# Patient Record
Sex: FEMALE | Race: BLACK OR AFRICAN AMERICAN | NOT HISPANIC OR LATINO | URBAN - METROPOLITAN AREA
[De-identification: names, ages, dates, MRNs, and addresses within clinical notes are randomized per-mention and may not be internally consistent; named-entity substitution may affect disease eponyms.]

---

## 2018-01-22 ENCOUNTER — EMERGENCY (EMERGENCY)
Facility: HOSPITAL | Age: 18
LOS: 1 days | Discharge: HOME | End: 2018-01-22
Attending: EMERGENCY MEDICINE | Admitting: EMERGENCY MEDICINE
Payer: MEDICAID

## 2018-01-22 VITALS
HEART RATE: 113 BPM | OXYGEN SATURATION: 99 % | WEIGHT: 169.98 LBS | DIASTOLIC BLOOD PRESSURE: 75 MMHG | RESPIRATION RATE: 22 BRPM | TEMPERATURE: 98 F | SYSTOLIC BLOOD PRESSURE: 146 MMHG

## 2018-01-22 PROCEDURE — 99291 CRITICAL CARE FIRST HOUR: CPT | Mod: 25

## 2018-01-22 PROCEDURE — 93010 ELECTROCARDIOGRAM REPORT: CPT

## 2018-01-22 RX ORDER — IPRATROPIUM/ALBUTEROL SULFATE 18-103MCG
3 AEROSOL WITH ADAPTER (GRAM) INHALATION
Qty: 0 | Refills: 0 | Status: COMPLETED | OUTPATIENT
Start: 2018-01-22 | End: 2018-01-22

## 2018-01-22 RX ADMIN — Medication 3 MILLILITER(S): at 23:45

## 2018-01-22 RX ADMIN — Medication 125 MILLIGRAM(S): at 23:45

## 2018-01-22 RX ADMIN — Medication 3 MILLILITER(S): at 23:42

## 2018-01-22 RX ADMIN — Medication 3 MILLILITER(S): at 23:44

## 2018-01-22 NOTE — ED ADULT TRIAGE NOTE - CHIEF COMPLAINT QUOTE
Pt BIBA for c/o asthma exacerbation, given 0.3 mg IM epi, duonebs, 12 mg dex and 2 grams of mag in the field. Pt with bilat wheeze, hyperventilating. MD Mayers at bedside.

## 2018-01-23 VITALS
SYSTOLIC BLOOD PRESSURE: 129 MMHG | RESPIRATION RATE: 24 BRPM | DIASTOLIC BLOOD PRESSURE: 60 MMHG | OXYGEN SATURATION: 96 % | HEART RATE: 108 BPM | TEMPERATURE: 98 F

## 2018-01-23 LAB
ALBUMIN SERPL ELPH-MCNC: 3.8 G/DL — SIGNIFICANT CHANGE UP (ref 3.4–5)
ALBUMIN SERPL ELPH-MCNC: 3.8 G/DL — SIGNIFICANT CHANGE UP (ref 3.4–5)
ALP SERPL-CCNC: 51 U/L — SIGNIFICANT CHANGE UP (ref 40–120)
ALP SERPL-CCNC: 55 U/L — SIGNIFICANT CHANGE UP (ref 40–120)
ALT FLD-CCNC: 13 U/L — SIGNIFICANT CHANGE UP (ref 12–42)
ALT FLD-CCNC: 9 U/L — LOW (ref 12–42)
ANION GAP SERPL CALC-SCNC: 15 MMOL/L — SIGNIFICANT CHANGE UP (ref 9–16)
ANION GAP SERPL CALC-SCNC: 16 MMOL/L — SIGNIFICANT CHANGE UP (ref 9–16)
AST SERPL-CCNC: 15 U/L — SIGNIFICANT CHANGE UP (ref 15–37)
AST SERPL-CCNC: 16 U/L — SIGNIFICANT CHANGE UP (ref 15–37)
BASOPHILS NFR BLD AUTO: 0.5 % — SIGNIFICANT CHANGE UP (ref 0–2)
BILIRUB SERPL-MCNC: 0.2 MG/DL — SIGNIFICANT CHANGE UP (ref 0.2–1.2)
BILIRUB SERPL-MCNC: 0.5 MG/DL — SIGNIFICANT CHANGE UP (ref 0.2–1.2)
BUN SERPL-MCNC: 10 MG/DL — SIGNIFICANT CHANGE UP (ref 7–23)
BUN SERPL-MCNC: 12 MG/DL — SIGNIFICANT CHANGE UP (ref 7–23)
CALCIUM SERPL-MCNC: 8.4 MG/DL — LOW (ref 8.5–10.5)
CALCIUM SERPL-MCNC: 8.5 MG/DL — SIGNIFICANT CHANGE UP (ref 8.5–10.5)
CHLORIDE SERPL-SCNC: 104 MMOL/L — SIGNIFICANT CHANGE UP (ref 96–108)
CHLORIDE SERPL-SCNC: 106 MMOL/L — SIGNIFICANT CHANGE UP (ref 96–108)
CO2 SERPL-SCNC: 18 MMOL/L — LOW (ref 22–31)
CO2 SERPL-SCNC: 19 MMOL/L — LOW (ref 22–31)
CREAT SERPL-MCNC: 0.87 MG/DL — SIGNIFICANT CHANGE UP (ref 0.5–1.3)
CREAT SERPL-MCNC: 1.14 MG/DL — SIGNIFICANT CHANGE UP (ref 0.5–1.3)
EOSINOPHIL NFR BLD AUTO: 1 % — SIGNIFICANT CHANGE UP (ref 0–6)
FLUAV SPEC QL CULT: NEGATIVE — SIGNIFICANT CHANGE UP
FLUBV AG SPEC QL IA: NEGATIVE — SIGNIFICANT CHANGE UP
GLUCOSE SERPL-MCNC: 159 MG/DL — HIGH (ref 70–99)
GLUCOSE SERPL-MCNC: 214 MG/DL — HIGH (ref 70–99)
HCG SERPL-ACNC: <1 MIU/ML — SIGNIFICANT CHANGE UP
HCT VFR BLD CALC: 38 % — SIGNIFICANT CHANGE UP (ref 34.5–45)
HCT VFR BLD CALC: 38.8 % — SIGNIFICANT CHANGE UP (ref 34.5–45)
HGB BLD-MCNC: 13.6 G/DL — SIGNIFICANT CHANGE UP (ref 11.5–15.5)
HGB BLD-MCNC: 13.9 G/DL — SIGNIFICANT CHANGE UP (ref 11.5–15.5)
IMM GRANULOCYTES NFR BLD AUTO: 0.6 % — SIGNIFICANT CHANGE UP (ref 0–1.5)
LYMPHOCYTES # BLD AUTO: 37.8 % — SIGNIFICANT CHANGE UP (ref 13–44)
MAGNESIUM SERPL-MCNC: 2.2 MG/DL — SIGNIFICANT CHANGE UP (ref 1.6–2.6)
MCHC RBC-ENTMCNC: 31.4 PG — SIGNIFICANT CHANGE UP (ref 27–34)
MCHC RBC-ENTMCNC: 31.4 PG — SIGNIFICANT CHANGE UP (ref 27–34)
MCHC RBC-ENTMCNC: 35.8 G/DL — SIGNIFICANT CHANGE UP (ref 32–36)
MCHC RBC-ENTMCNC: 35.8 G/DL — SIGNIFICANT CHANGE UP (ref 32–36)
MCV RBC AUTO: 87.8 FL — SIGNIFICANT CHANGE UP (ref 80–100)
MCV RBC AUTO: 87.8 FL — SIGNIFICANT CHANGE UP (ref 80–100)
MONOCYTES NFR BLD AUTO: 3.7 % — SIGNIFICANT CHANGE UP (ref 2–14)
NEUTROPHILS NFR BLD AUTO: 56.4 % — SIGNIFICANT CHANGE UP (ref 43–77)
PCO2 BLDV: 27 MMHG — LOW (ref 41–51)
PCO2 BLDV: 29 MMHG — LOW (ref 41–51)
PH BLDV: 7.41 — SIGNIFICANT CHANGE UP (ref 7.32–7.43)
PH BLDV: 7.44 — HIGH (ref 7.32–7.43)
PLATELET # BLD AUTO: 215 K/UL — SIGNIFICANT CHANGE UP (ref 150–400)
PLATELET # BLD AUTO: 228 K/UL — SIGNIFICANT CHANGE UP (ref 150–400)
PO2 BLDV: 68 MMHG — HIGH (ref 35–40)
PO2 BLDV: 81 MMHG — HIGH (ref 35–40)
POTASSIUM SERPL-MCNC: 2.5 MMOL/L — CRITICAL LOW (ref 3.5–5.3)
POTASSIUM SERPL-MCNC: 2.9 MMOL/L — CRITICAL LOW (ref 3.5–5.3)
POTASSIUM SERPL-SCNC: 2.5 MMOL/L — CRITICAL LOW (ref 3.5–5.3)
POTASSIUM SERPL-SCNC: 2.9 MMOL/L — CRITICAL LOW (ref 3.5–5.3)
PROT SERPL-MCNC: 6.8 G/DL — SIGNIFICANT CHANGE UP (ref 6.4–8.2)
PROT SERPL-MCNC: 7.1 G/DL — SIGNIFICANT CHANGE UP (ref 6.4–8.2)
RAPID RVP RESULT: SIGNIFICANT CHANGE UP
RBC # BLD: 4.33 M/UL — SIGNIFICANT CHANGE UP (ref 3.8–5.2)
RBC # BLD: 4.42 M/UL — SIGNIFICANT CHANGE UP (ref 3.8–5.2)
RBC # FLD: 12.1 % — SIGNIFICANT CHANGE UP (ref 10.3–16.9)
RBC # FLD: 12.1 % — SIGNIFICANT CHANGE UP (ref 10.3–16.9)
SAO2 % BLDV: 94 % — SIGNIFICANT CHANGE UP
SAO2 % BLDV: 97 % — SIGNIFICANT CHANGE UP
SODIUM SERPL-SCNC: 138 MMOL/L — SIGNIFICANT CHANGE UP (ref 132–145)
SODIUM SERPL-SCNC: 140 MMOL/L — SIGNIFICANT CHANGE UP (ref 132–145)
WBC # BLD: 10 K/UL — SIGNIFICANT CHANGE UP (ref 3.8–10.5)
WBC # BLD: 10.4 K/UL — SIGNIFICANT CHANGE UP (ref 3.8–10.5)
WBC # FLD AUTO: 10 K/UL — SIGNIFICANT CHANGE UP (ref 3.8–10.5)
WBC # FLD AUTO: 10.4 K/UL — SIGNIFICANT CHANGE UP (ref 3.8–10.5)

## 2018-01-23 PROCEDURE — 71275 CT ANGIOGRAPHY CHEST: CPT | Mod: 26

## 2018-01-23 RX ORDER — ALBUTEROL 90 UG/1
2.5 AEROSOL, METERED ORAL ONCE
Qty: 0 | Refills: 0 | Status: DISCONTINUED | OUTPATIENT
Start: 2018-01-23 | End: 2018-01-26

## 2018-01-23 RX ORDER — POTASSIUM CHLORIDE 20 MEQ
40 PACKET (EA) ORAL ONCE
Qty: 0 | Refills: 0 | Status: COMPLETED | OUTPATIENT
Start: 2018-01-23 | End: 2018-01-23

## 2018-01-23 RX ORDER — ALBUTEROL 90 UG/1
2.5 AEROSOL, METERED ORAL ONCE
Qty: 0 | Refills: 0 | Status: COMPLETED | OUTPATIENT
Start: 2018-01-23 | End: 2018-01-23

## 2018-01-23 RX ADMIN — ALBUTEROL 2.5 MILLIGRAM(S): 90 AEROSOL, METERED ORAL at 01:05

## 2018-01-23 RX ADMIN — Medication 40 MILLIEQUIVALENT(S): at 01:47

## 2018-01-23 NOTE — ED PROVIDER NOTE - PROGRESS NOTE DETAILS
pt reports some improvement but peak flow 150 despite treatment, will CTA r/o PE, if neg, will need admission for asthma exacerbation d/w [facility], no stepdown beds, will call [facility] system for transfer for stepdown bed, pt amenable to [place] transfer. d/w [facility], no stepdown beds, will call other system for transfer    vbg added, repeat bmp ordered, pt continues to report feeling sob, neg ct, multiple rounds of bronchodilators given, will place on bipap (no retractions noted, but noted pt taking deep breaths w/ increased effort) upon initial evaluation at triage, pt has good air flow, end exp wheezing noted, no retractions, no respiratory distress, felt bipap not indicated at the moment reassessed after additional meds given in ED, pt reports some improvement but peak flow 150 despite treatment, +wheezing, pt states feels worse than her typical asthma, at this point, will CTA r/o PE, if neg, will need admission for asthma exacerbation (feels pt does not require MICU, but stepdown unit may be appropriate for management) d/w [facility], no stepdown beds, will call other system for transfer  vbg added, repeat bmp ordered, pt continues to report feeling sob, neg ct, multiple rounds of bronchodilators given, will place on bipap (no retractions noted, but noted pt taking deep breaths w/ increased effort)  d/w [facility] MICU fellow, accepting pt to MICU, under Dr. Resendez ([facility]) noted hypokalemia, likely 2/2 multiple albuterol use, will replete and recheck

## 2018-01-23 NOTE — ED PROVIDER NOTE - OBJECTIVE STATEMENT
17 yof pw asthma exacerbation, wheezing.  hx of asthma, no prior intubation.  occurred suddenly while on train, no OCP or hormone use, no leg pain/swelling, no hx of CAD or PE.  rec'd 3 duonebs, 2g of MgSulfate and 0.3IM epi via EMS. 17 yof pw asthma exacerbation, wheezing.  hx of asthma, no prior intubation.  occurred suddenly while on train, no OCP or hormone use, no leg pain/swelling, no hx of CAD or PE.  rec'd 3 duonebs, 2g of MgSulfate and 0.3IM epi via EMS.  upon arrival, at triage, pt had end exp wheezing but moving good air, no retractions noted.

## 2018-01-23 NOTE — ED PROVIDER NOTE - PHYSICAL EXAMINATION
CON: ao x 3, HENMT: clear oropharynx, soft neck, HEAD: atraumatic, CV: tachycardia, RESP: end exp wheezing, good air entry, GI: +BS, soft, nontender, no rebound, no guarding, SKIN: no rash, MSK: no deformities, no calf swelling or tenderness, NEURO: no gross motor or sensory deficit

## 2018-01-23 NOTE — ED ADULT NURSE REASSESSMENT NOTE - NS ED NURSE REASSESS COMMENT FT1
peak flow 150
peak flow after 3 duonebs and 3 albuterols tx is 150. MD is aware.
placed on bipap.  Educated to the nature and process of assisted mechanical ventilation.  Well tolerated.  nebulizer applied and labs drawn and sent.  no further distress noted

## 2018-01-23 NOTE — ED PROVIDER NOTE - MEDICAL DECISION MAKING DETAILS
additional nebs given, solumedrol given, reassess additional nebs given, solumedrol given, reassess, see progress note

## 2018-01-23 NOTE — ED PROVIDER NOTE - CARE PLAN
Principal Discharge DX:	Asthma exacerbation Principal Discharge DX:	Asthma exacerbation  Secondary Diagnosis:	Hypokalemia

## 2018-01-26 DIAGNOSIS — J45.901 UNSPECIFIED ASTHMA WITH (ACUTE) EXACERBATION: ICD-10-CM

## 2018-01-26 DIAGNOSIS — E87.6 HYPOKALEMIA: ICD-10-CM

## 2022-09-07 ENCOUNTER — INPATIENT (INPATIENT)
Facility: HOSPITAL | Age: 22
LOS: 0 days | Discharge: HOME | End: 2022-09-07
Attending: OBSTETRICS & GYNECOLOGY | Admitting: OBSTETRICS & GYNECOLOGY

## 2022-09-07 VITALS
RESPIRATION RATE: 18 BRPM | TEMPERATURE: 99 F | HEART RATE: 77 BPM | OXYGEN SATURATION: 98 % | DIASTOLIC BLOOD PRESSURE: 69 MMHG | SYSTOLIC BLOOD PRESSURE: 114 MMHG

## 2022-09-07 VITALS
TEMPERATURE: 98 F | HEART RATE: 72 BPM | OXYGEN SATURATION: 99 % | DIASTOLIC BLOOD PRESSURE: 54 MMHG | RESPIRATION RATE: 16 BRPM | SYSTOLIC BLOOD PRESSURE: 107 MMHG

## 2022-09-07 PROBLEM — J45.909 UNSPECIFIED ASTHMA, UNCOMPLICATED: Chronic | Status: ACTIVE | Noted: 2018-01-23

## 2022-09-07 LAB
BLD GP AB SCN SERPL QL: NEGATIVE — SIGNIFICANT CHANGE UP
COVID-19 SPIKE DOMAIN AB INTERP: POSITIVE
COVID-19 SPIKE DOMAIN ANTIBODY RESULT: >250 U/ML — HIGH
HCT VFR BLD CALC: 30.6 % — LOW (ref 34.5–45)
HGB BLD-MCNC: 10.9 G/DL — LOW (ref 11.5–15.5)
MCHC RBC-ENTMCNC: 33 PG — SIGNIFICANT CHANGE UP (ref 27–34)
MCHC RBC-ENTMCNC: 35.6 GM/DL — SIGNIFICANT CHANGE UP (ref 32–36)
MCV RBC AUTO: 92.7 FL — SIGNIFICANT CHANGE UP (ref 80–100)
NRBC # BLD: 0 /100 WBCS — SIGNIFICANT CHANGE UP (ref 0–0)
NRBC # FLD: 0 K/UL — SIGNIFICANT CHANGE UP (ref 0–0)
PLATELET # BLD AUTO: 122 K/UL — LOW (ref 150–400)
RBC # BLD: 3.3 M/UL — LOW (ref 3.8–5.2)
RBC # FLD: 12.2 % — SIGNIFICANT CHANGE UP (ref 10.3–14.5)
RH IG SCN BLD-IMP: POSITIVE — SIGNIFICANT CHANGE UP
SARS-COV-2 IGG+IGM SERPL QL IA: >250 U/ML — HIGH
SARS-COV-2 IGG+IGM SERPL QL IA: POSITIVE
WBC # BLD: 12.08 K/UL — HIGH (ref 3.8–10.5)
WBC # FLD AUTO: 12.08 K/UL — HIGH (ref 3.8–10.5)

## 2022-09-07 RX ORDER — OXYCODONE HYDROCHLORIDE 5 MG/1
5 TABLET ORAL
Refills: 0 | Status: DISCONTINUED | OUTPATIENT
Start: 2022-09-07 | End: 2022-09-07

## 2022-09-07 RX ORDER — KETOROLAC TROMETHAMINE 30 MG/ML
30 SYRINGE (ML) INJECTION ONCE
Refills: 0 | Status: DISCONTINUED | OUTPATIENT
Start: 2022-09-07 | End: 2022-09-07

## 2022-09-07 RX ORDER — OXYCODONE HYDROCHLORIDE 5 MG/1
5 TABLET ORAL ONCE
Refills: 0 | Status: DISCONTINUED | OUTPATIENT
Start: 2022-09-07 | End: 2022-09-07

## 2022-09-07 RX ORDER — TETANUS TOXOID, REDUCED DIPHTHERIA TOXOID AND ACELLULAR PERTUSSIS VACCINE, ADSORBED 5; 2.5; 8; 8; 2.5 [IU]/.5ML; [IU]/.5ML; UG/.5ML; UG/.5ML; UG/.5ML
0.5 SUSPENSION INTRAMUSCULAR ONCE
Refills: 0 | Status: DISCONTINUED | OUTPATIENT
Start: 2022-09-07 | End: 2022-09-07

## 2022-09-07 RX ORDER — SIMETHICONE 80 MG/1
80 TABLET, CHEWABLE ORAL EVERY 4 HOURS
Refills: 0 | Status: DISCONTINUED | OUTPATIENT
Start: 2022-09-07 | End: 2022-09-07

## 2022-09-07 RX ORDER — DIPHENHYDRAMINE HCL 50 MG
25 CAPSULE ORAL EVERY 6 HOURS
Refills: 0 | Status: DISCONTINUED | OUTPATIENT
Start: 2022-09-07 | End: 2022-09-07

## 2022-09-07 RX ORDER — BENZOCAINE 10 %
1 GEL (GRAM) MUCOUS MEMBRANE EVERY 6 HOURS
Refills: 0 | Status: DISCONTINUED | OUTPATIENT
Start: 2022-09-07 | End: 2022-09-07

## 2022-09-07 RX ORDER — HYDROCORTISONE 1 %
1 OINTMENT (GRAM) TOPICAL EVERY 6 HOURS
Refills: 0 | Status: DISCONTINUED | OUTPATIENT
Start: 2022-09-07 | End: 2022-09-07

## 2022-09-07 RX ORDER — ACETAMINOPHEN 500 MG
2 TABLET ORAL
Qty: 0 | Refills: 0 | DISCHARGE

## 2022-09-07 RX ORDER — INFLUENZA VIRUS VACCINE 15; 15; 15; 15 UG/.5ML; UG/.5ML; UG/.5ML; UG/.5ML
0.5 SUSPENSION INTRAMUSCULAR ONCE
Refills: 0 | Status: DISCONTINUED | OUTPATIENT
Start: 2022-09-07 | End: 2022-09-07

## 2022-09-07 RX ORDER — MAGNESIUM HYDROXIDE 400 MG/1
30 TABLET, CHEWABLE ORAL
Refills: 0 | Status: DISCONTINUED | OUTPATIENT
Start: 2022-09-07 | End: 2022-09-07

## 2022-09-07 RX ORDER — OXYTOCIN 10 UNIT/ML
333.33 VIAL (ML) INJECTION
Qty: 20 | Refills: 0 | Status: DISCONTINUED | OUTPATIENT
Start: 2022-09-07 | End: 2022-09-07

## 2022-09-07 RX ORDER — AER TRAVELER 0.5 G/1
1 SOLUTION RECTAL; TOPICAL EVERY 4 HOURS
Refills: 0 | Status: DISCONTINUED | OUTPATIENT
Start: 2022-09-07 | End: 2022-09-07

## 2022-09-07 RX ORDER — LANOLIN
1 OINTMENT (GRAM) TOPICAL EVERY 6 HOURS
Refills: 0 | Status: DISCONTINUED | OUTPATIENT
Start: 2022-09-07 | End: 2022-09-07

## 2022-09-07 RX ORDER — DIBUCAINE 1 %
1 OINTMENT (GRAM) RECTAL EVERY 6 HOURS
Refills: 0 | Status: DISCONTINUED | OUTPATIENT
Start: 2022-09-07 | End: 2022-09-07

## 2022-09-07 RX ORDER — IBUPROFEN 200 MG
600 TABLET ORAL EVERY 6 HOURS
Refills: 0 | Status: DISCONTINUED | OUTPATIENT
Start: 2022-09-07 | End: 2022-09-07

## 2022-09-07 RX ORDER — ACETAMINOPHEN 500 MG
975 TABLET ORAL
Refills: 0 | Status: DISCONTINUED | OUTPATIENT
Start: 2022-09-07 | End: 2022-09-07

## 2022-09-07 RX ORDER — IBUPROFEN 200 MG
600 TABLET ORAL EVERY 6 HOURS
Refills: 0 | Status: COMPLETED | OUTPATIENT
Start: 2022-09-07 | End: 2023-08-06

## 2022-09-07 RX ORDER — PRAMOXINE HYDROCHLORIDE 150 MG/15G
1 AEROSOL, FOAM RECTAL EVERY 4 HOURS
Refills: 0 | Status: DISCONTINUED | OUTPATIENT
Start: 2022-09-07 | End: 2022-09-07

## 2022-09-07 RX ORDER — IBUPROFEN 200 MG
1 TABLET ORAL
Qty: 0 | Refills: 0 | DISCHARGE

## 2022-09-07 RX ORDER — SODIUM CHLORIDE 9 MG/ML
3 INJECTION INTRAMUSCULAR; INTRAVENOUS; SUBCUTANEOUS EVERY 8 HOURS
Refills: 0 | Status: DISCONTINUED | OUTPATIENT
Start: 2022-09-07 | End: 2022-09-07

## 2022-09-07 RX ADMIN — Medication 975 MILLIGRAM(S): at 04:06

## 2022-09-07 RX ADMIN — Medication 1 APPLICATION(S): at 08:20

## 2022-09-07 RX ADMIN — Medication 600 MILLIGRAM(S): at 08:22

## 2022-09-07 RX ADMIN — Medication 600 MILLIGRAM(S): at 11:23

## 2022-09-07 NOTE — OB PROVIDER H&P - NSSCHADMISSION_OBGYN_A_OB
Denies known Latex allergy or symptoms of Latex sensitivity.  Medications reviewed and updated.  Pt. is here fasting for a follow up on her cholesterol.   No

## 2022-09-07 NOTE — DISCHARGE NOTE OB - CARE PLAN
Principal Discharge DX:	Normal vaginal delivery  Assessment and plan of treatment:	After discharge, please stay on pelvic rest for 6 weeks, meaning no sexual intercourse, no tampons and no douching.  No driving for 2 weeks as women can loose a lot of blood during delivery and there is a possibility of being lightheaded/fainting.  No lifting objects heavier than baby for two weeks.  Expect to have vaginal bleeding/spotting for up to six weeks.  The bleeding should get lighter and more white/light brown with time.  For bleeding soaking more than a pad an hour or passing clots greater than the size of your fist, come in to the emergency department.    Follow up in [facility] in 6 weeks.   1

## 2022-09-07 NOTE — CHART NOTE - NSCHARTNOTEFT_GEN_A_CORE
JUWAN JIM  22y  Female [id number]    HPI:    23yo  s/p  9/6 w/ First Degree tear . Patient was transferred from [place] for infant care at [facility]. Unfortunately infant passed at [facility] after patient arrived to [facility]. Currently patient feels "ok" but with sad affect. She denies any bothersome symptoms. Denies fevers, chills, CP, SOB. Has abdominal cramping. Bleeding has decreased since delivery.     Spoke to [place] resident over the phone as well. Per their records, patient had intermittent prenatal care but was seeing MFM and getting sonograms. 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 She had amniocentesis performed which showed 46XX, no genetic abnormalities detected.    – OB Hx:  G1: TOP  – PMH: denies  – Meds: PNV   – Allergies: Penicillin (hives), Latex (hives)  – PSHx: denies  – Social: [facility]      Hospital Meds:   MEDICATIONS  (STANDING):  acetaminophen     Tablet .. 975 milliGRAM(s) Oral <User Schedule>  diphtheria/tetanus/pertussis (acellular) Vaccine (ADAcel) 0.5 milliLiter(s) IntraMuscular once  ibuprofen  Tablet. 600 milliGRAM(s) Oral every 6 hours  influenza   Vaccine 0.5 milliLiter(s) IntraMuscular once  ketorolac   Injectable 30 milliGRAM(s) IV Push once  oxytocin Infusion 333.333 milliUNIT(s)/Min (1000 mL/Hr) IV Continuous <Continuous>  prenatal multivitamin 1 Tablet(s) Oral daily  sodium chloride 0.9% lock flush 3 milliLiter(s) IV Push every 8 hours    MEDICATIONS  (PRN):  benzocaine 20%/menthol 0.5% Spray 1 Spray(s) Topical every 6 hours PRN for Perineal discomfort  dibucaine 1% Ointment 1 Application(s) Topical every 6 hours PRN Perineal discomfort  diphenhydrAMINE 25 milliGRAM(s) Oral every 6 hours PRN Pruritus  hydrocortisone 1% Cream 1 Application(s) Topical every 6 hours PRN Moderate Pain (4-6)  lanolin Ointment 1 Application(s) Topical every 6 hours PRN nipple soreness  magnesium hydroxide Suspension 30 milliLiter(s) Oral two times a day PRN Constipation  oxyCODONE    IR 5 milliGRAM(s) Oral every 3 hours PRN Moderate to Severe Pain (4-10)  oxyCODONE    IR 5 milliGRAM(s) Oral once PRN Moderate to Severe Pain (4-10)  pramoxine 1%/zinc 5% Cream 1 Application(s) Topical every 4 hours PRN Moderate Pain (4-6)  simethicone 80 milliGRAM(s) Chew every 4 hours PRN Gas  witch hazel Pads 1 Application(s) Topical every 4 hours PRN Perineal discomfort      Vital Signs Last 24 Hrs  T(C): 37.2 (07 Sep 2022 06:33), Max: 37.2 (07 Sep 2022 06:33)  T(F): 99 (07 Sep 2022 06:33), Max: 99 (07 Sep 2022 06:33)  HR: 72 (07 Sep 2022 06:33) (72 - 75)  BP: 107/54 (07 Sep 2022 06:33) (78/54 - 107/54)  BP(mean): --  RR: 16 (07 Sep 2022 06:33) (16 - 16)  SpO2: 100% (07 Sep 2022 06:31) (99% - 100%)    Parameters below as of 07 Sep 2022 06:31  Patient On (Oxygen Delivery Method): room air      Physical Exam:   General: sitting comfortably in bed, NAD   HEENT: neck supple, full ROM  CV: RR S1S2 no m/r/g  Lungs: CTA b/l, good air flow b/l   Back: No CVA tenderness  Abd: Soft, non-tender, non-distended. Fundus firm.   Ext: non-tender b/l, no edema       23yo  s/p  9/6 w/ First Degree tear . Patient was transferred from [place] for infant care at [facility], who unfortunately passed away.  - SW consult placed for AM  - Tylenol/Motrin/Oxy for PP pain control  - Send CBC  - Prenatal labs in chart, no need for additional labs  - Rh+; no need for Rhogam  - VS per protocol    plan of care dw Dr.Jones Todd Botello PGY4 JUWAN JIM  22y  Female [id number]    HPI:    23yo  s/p  9/6 w/ First Degree tear . Patient was transferred from [place] for infant care at [facility]. Unfortunately infant passed at [facility] after patient arrived to [facility]. Currently patient feels "ok" but with sad affect. She denies any bothersome symptoms. Denies fevers, chills, CP, SOB. Has abdominal cramping. Bleeding has decreased since delivery.     Spoke to [place] resident over the phone as well. Per their records, patient had intermittent prenatal care but was seeing MFM and getting sonograms. 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 She had amniocentesis performed which showed 46XX, no genetic abnormalities detected.    – OB Hx:  G1: TOP  – PMH: denies  – Meds: PNV   – Allergies: Penicillin (hives), Latex (hives)  – PSHx: denies  – Social: [facility]      Hospital Meds:   MEDICATIONS  (STANDING):  acetaminophen     Tablet .. 975 milliGRAM(s) Oral <User Schedule>  diphtheria/tetanus/pertussis (acellular) Vaccine (ADAcel) 0.5 milliLiter(s) IntraMuscular once  ibuprofen  Tablet. 600 milliGRAM(s) Oral every 6 hours  influenza   Vaccine 0.5 milliLiter(s) IntraMuscular once  ketorolac   Injectable 30 milliGRAM(s) IV Push once  oxytocin Infusion 333.333 milliUNIT(s)/Min (1000 mL/Hr) IV Continuous <Continuous>  prenatal multivitamin 1 Tablet(s) Oral daily  sodium chloride 0.9% lock flush 3 milliLiter(s) IV Push every 8 hours    MEDICATIONS  (PRN):  benzocaine 20%/menthol 0.5% Spray 1 Spray(s) Topical every 6 hours PRN for Perineal discomfort  dibucaine 1% Ointment 1 Application(s) Topical every 6 hours PRN Perineal discomfort  diphenhydrAMINE 25 milliGRAM(s) Oral every 6 hours PRN Pruritus  hydrocortisone 1% Cream 1 Application(s) Topical every 6 hours PRN Moderate Pain (4-6)  lanolin Ointment 1 Application(s) Topical every 6 hours PRN nipple soreness  magnesium hydroxide Suspension 30 milliLiter(s) Oral two times a day PRN Constipation  oxyCODONE    IR 5 milliGRAM(s) Oral every 3 hours PRN Moderate to Severe Pain (4-10)  oxyCODONE    IR 5 milliGRAM(s) Oral once PRN Moderate to Severe Pain (4-10)  pramoxine 1%/zinc 5% Cream 1 Application(s) Topical every 4 hours PRN Moderate Pain (4-6)  simethicone 80 milliGRAM(s) Chew every 4 hours PRN Gas  witch hazel Pads 1 Application(s) Topical every 4 hours PRN Perineal discomfort      Vital Signs Last 24 Hrs  T(C): 37.2 (07 Sep 2022 06:33), Max: 37.2 (07 Sep 2022 06:33)  T(F): 99 (07 Sep 2022 06:33), Max: 99 (07 Sep 2022 06:33)  HR: 72 (07 Sep 2022 06:33) (72 - 75)  BP: 107/54 (07 Sep 2022 06:33) (78/54 - 107/54)  BP(mean): --  RR: 16 (07 Sep 2022 06:33) (16 - 16)  SpO2: 100% (07 Sep 2022 06:31) (99% - 100%)    Parameters below as of 07 Sep 2022 06:31  Patient On (Oxygen Delivery Method): room air      Physical Exam:   General: sitting comfortably in bed, NAD   HEENT: neck supple, full ROM  CV: RR S1S2 no m/r/g  Lungs: CTA b/l, good air flow b/l   Back: No CVA tenderness  Abd: Soft, non-tender, non-distended. Fundus firm.   Ext: non-tender b/l, no edema       23yo  s/p  9/6 w/ First Degree tear . Patient was transferred from [place] for infant care at [facility], who unfortunately passed away.  - SW consult placed for AM  - Tylenol/Motrin/Oxy for PP pain control  - Send CBC  - Prenatal labs in chart, no need for additional labs  - Rh+; no need for Rhogam  - VS per protocol    plan of care burak Botello PGY4      22 @ 10:56 ( late entry)  Associate Chief of L & D    Prenatal care: [facility]      I have met JUWAN JIM for the first time this morning.  She  is a 22y  s/p  at [facility] who was transferred early morning before my shift due to the fact that this baby needed a higher level of care.  After speaking with the patient and her partner she reported that she was made aware that "the baby had a problem with the lungs and the organs were not in the correct area and that the MFM was following her."  She also stated that she had clinic visits separate from the MFM visits( with DR Garcia) ,.  and her last visit was  and that her MFM visits were more frequent.  From the records that came with her there is the documented  visits for which she saw Jackie Villegas MD.  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  Unfortunately when she arrived to [facility] and went to the NICU  the baby did demise.       issues :as stated above        Prenatal labs: reviewed     Ob Hx: 2017 TOP A    Gyn Hx: reg cycles, denies any other gyn issues    Past Medical History: asthma and anxiety    Past Surgical History: deneis      Medications: acetaminophen     Tablet .. 975 milliGRAM(s) Oral <User Schedule>  benzocaine 20%/menthol 0.5% Spray 1 Spray(s) Topical every 6 hours PRN  dibucaine 1% Ointment 1 Application(s) Topical every 6 hours PRN  diphenhydrAMINE 25 milliGRAM(s) Oral every 6 hours PRN  diphtheria/tetanus/pertussis (acellular) Vaccine (ADAcel) 0.5 milliLiter(s) IntraMuscular once  hydrocortisone 1% Cream 1 Application(s) Topical every 6 hours PRN  ibuprofen  Tablet. 600 milliGRAM(s) Oral every 6 hours  influenza   Vaccine 0.5 milliLiter(s) IntraMuscular once  lanolin Ointment 1 Application(s) Topical every 6 hours PRN  magnesium hydroxide Suspension 30 milliLiter(s) Oral two times a day PRN  oxyCODONE    IR 5 milliGRAM(s) Oral every 3 hours PRN  oxyCODONE    IR 5 milliGRAM(s) Oral once PRN  pramoxine 1%/zinc 5% Cream 1 Application(s) Topical every 4 hours PRN  prenatal multivitamin 1 Tablet(s) Oral daily  simethicone 80 milliGRAM(s) Chew every 4 hours PRN  sodium chloride 0.9% lock flush 3 milliLiter(s) IV Push every 8 hours  witch hazel Pads 1 Application(s) Topical every 4 hours PRN        Allergies: latex (Rash)  penicillin (Rash)        Social History: denies any ETOH, drugs or cigs      Objective:    Vitals:T(C): 37.2 (22 @ 10:21), Max: 37.2 (22 @ 06:33)  HR: 77 (22 @ 10:21) (72 - 77)  BP: 114/69 (22 @ 10:21) (78/54 - 114/69)  RR: 18 (22 @ 10:21) (16 - 18)  SpO2: 98% (22 @ 10:21) (98% - 100%)       General:	    Abdomen: soft, gravid, non-tender, non-distended  Lochia: scant  Ext: trace 	        Assessment: JUWAN yanes [id number] at 37 weeks transferred from [facility] due to a  that was emergently transferred for a higher level of care which ultimately demised this morning         Plan:    - Admit to PP  - seen by SW and cleared for discharge and she will follow up at [facility]    Pratibha Ruiz M.D., M.B.A., M.S.

## 2022-09-07 NOTE — OB PROVIDER H&P - HISTORY OF PRESENT ILLNESS
Admission H&P    Subjective  HPI: 22yoF GP now PPD1 from [facility], uncomplicated, presents as a postpartum transfer from [place] due to infant requiring higher level of care in NICU at [facility].    – PNC: denies prenatal issues. GBS _.  EFW _g by eddie.    – OB Hx:  – GYN Hx:  denies abnormal pap smears, fibroids, polyps, ovarian cysts, PCOS, Endometriosis, STIs    – PMH: denies  – Meds: PNV   – Allergies: NKDA  – PSHx: denies  – Social: denies   – Will accept blood transfusions? Yes Admission H&P    Subjective  HPI: 22yoF GP now PPD1 from [facility], uncomplicated, presents as a postpartum transfer from [place] due to infant requiring higher level of care in NICU at [facility]. Approximately 1 hour after patient was settled in her postpartum room, she was taken to NICU to be with her infant due to infant's impending demise. The infant is now . History largely gathered from postpartum floor RN, to allow patient time to grieve.    – PNC: denies prenatal issues.    – OB Hx:  G1: TOP  G2:  at [place], uncomplicated    – PMH: denies  – Meds: PNV   – Allergies: Penicillin (hives), Latex (hives)  – PSHx: denies  – Social: denies

## 2022-09-07 NOTE — DISCHARGE NOTE OB - MEDICATION SUMMARY - MEDICATIONS TO TAKE
I will START or STAY ON the medications listed below when I get home from the hospital:    Tylenol 8 Hour 650 mg oral tablet, extended release  -- 2 tab(s) by mouth every 8 hours  -- Indication: For vaginal delivery    ibuprofen 600 mg oral tablet  -- 1 tab(s) by mouth every 6 hours  -- Indication: For vaginal delivery

## 2022-09-07 NOTE — OB PROVIDER H&P - NSHPPHYSICALEXAM_GEN_ALL_CORE
Objective    Vital Signs Last 24 Hrs  T(C): 36.8 (07 Sep 2022 01:27), Max: 36.8 (07 Sep 2022 01:27)  T(F): 98.2 (07 Sep 2022 01:27), Max: 98.2 (07 Sep 2022 01:27)  HR: 72 (07 Sep 2022 01:27) (72 - 72)  BP: 107/54 (07 Sep 2022 01:27) (107/54 - 107/54)  RR: 16 (07 Sep 2022 01:27) (16 - 16)  SpO2: 99% (07 Sep 2022 01:27) (99% - 99%)    – Physical exam  CV: extremities well perfused  Pulm: normal respiratory effort on room air  Abd: gravid, nontender  Extr: moving all extremities with ease Objective    Vital Signs Last 24 Hrs  T(C): 36.8 (07 Sep 2022 01:27), Max: 36.8 (07 Sep 2022 01:27)  T(F): 98.2 (07 Sep 2022 01:27), Max: 98.2 (07 Sep 2022 01:27)  HR: 72 (07 Sep 2022 01:27) (72 - 72)  BP: 107/54 (07 Sep 2022 01:27) (107/54 - 107/54)  RR: 16 (07 Sep 2022 01:27) (16 - 16)  SpO2: 99% (07 Sep 2022 01:27) (99% - 99%)

## 2022-09-07 NOTE — OB PROVIDER H&P - ASSESSMENT
A/P: 21yo PPD#1 s/p , uncomplicated, presents as a postpartum transfer from [place] due to infant requiring higher level of care in NICU at [facility]. Approximately 1 hour after patient was settled in her postpartum room, she was taken to NICU to be with her infant due to infant's impending demise. The infant is now . History largely gathered from postpartum floor RN, to allow patient time to grieve. Patient is otherwise stable and physically doing well post-partum.    - Social work consult pending due to infant death  - Pain well controlled, continue current pain regimen  - Increase ambulation, SCDs when not ambulating  - Continue regular diet  - F/u admission labs    [place], PGY1

## 2022-09-07 NOTE — DISCHARGE NOTE OB - HOSPITAL COURSE
Patient had uncomplicated, nonsurgical vaginal delivery at [facility]. She was then transferred to [facility] for infant care wt [facility]. Infant passed away on day 1 of life at [facility]. During postpartum course patient's vitals were stable, vaginal bleeding appropriate, and pain well controlled.  On same day of discharge patient was ambulating, her pain controlled with oral medications, had adequate oral intake, and was voiding freely.  Discharge instructions and precautions were given.  Will return to [facility] clinic in 6 weeks for postpartum visit.

## 2022-09-07 NOTE — DISCHARGE NOTE OB - PLAN OF CARE
After discharge, please stay on pelvic rest for 6 weeks, meaning no sexual intercourse, no tampons and no douching.  No driving for 2 weeks as women can loose a lot of blood during delivery and there is a possibility of being lightheaded/fainting.  No lifting objects heavier than baby for two weeks.  Expect to have vaginal bleeding/spotting for up to six weeks.  The bleeding should get lighter and more white/light brown with time.  For bleeding soaking more than a pad an hour or passing clots greater than the size of your fist, come in to the emergency department.    Follow up in [facility] in 6 weeks.

## 2022-09-07 NOTE — OB PROVIDER H&P - NS_GESTAGE_OBGYN_ALL_OB_FT
"Received a call from patient who indicates that he has concerns of \" white spots\" at the back of his throat and worsening pain with swallowing. Patient indicates that this has worsened over the past few days. He indicates his wife looked at his throat and indicates the back of the throat and feels that there is multiple areas of white spots.    Patient called and spoke with his PCP who prescribed him medication for thrush. He will start this and advised patient to return call to writer if there is no improvement by early next week. Patient is able to eat and drink enough at this time without difficulty. We will arrange for sooner follow-up if needed. Patient verbalized understanding and appreciative of call.     Lesa Carrillo RN, BSN    "
[id number]

## 2022-09-07 NOTE — DISCHARGE NOTE OB - PROVIDER TOKENS
FREE:[LAST:[[facility]],PHONE:[phone]],FAX:[(   )    -],ADDRESS:[[address] [place] [address] [place] [address]FOLLOWUP:[Routine]]

## 2022-09-07 NOTE — DISCHARGE NOTE OB - CARE PROVIDER_API CALL
[address] [facility] EMERGENCY DEPT [address] [id number] [phone] Work/School Note Date: 10/6/2017 To Whom It May concern: 
 
Fabiana Ordoñez was seen and treated today in the emergency room by the following provider(s): 
Attending Provider: Mariah Pierre MD. Fabiana Ordoñez may return to work on 10/7/17. Sincerely, Eve Spurling, RN 
 
 
 
 [facility],   [address] [facility] [address]  Phone: [phone]  Fax: (   )    -  Follow Up Time: Routine

## 2022-09-07 NOTE — DISCHARGE NOTE OB - PATIENT PORTAL LINK FT
You can access the FollowMyHealth Patient Portal offered by [facility] by registering at the following website: [email or web address]. By joining [employer]’s FollowMyHealth portal, you will also be able to view your health information using other applications (apps) compatible with our system.